# Patient Record
Sex: FEMALE
[De-identification: names, ages, dates, MRNs, and addresses within clinical notes are randomized per-mention and may not be internally consistent; named-entity substitution may affect disease eponyms.]

---

## 2021-03-31 PROBLEM — Z00.00 ENCOUNTER FOR PREVENTIVE HEALTH EXAMINATION: Status: ACTIVE | Noted: 2021-03-31

## 2021-05-11 ENCOUNTER — APPOINTMENT (OUTPATIENT)
Dept: GASTROENTEROLOGY | Facility: CLINIC | Age: 81
End: 2021-05-11
Payer: SUBSIDIZED

## 2021-05-11 PROCEDURE — 99214 OFFICE O/P EST MOD 30 MIN: CPT | Mod: 95

## 2021-05-13 NOTE — ASSESSMENT
[FreeTextEntry1] : 80[address] ref [address] DR. KRISTAL SOLER (GI) in [place] about MCAS\par - obtain all outside records including scopes\par - check tryptase\par - f/u with AI to check urine metabolites\par - consider scope with mast cell bx\par - refer to MAST program at [facility]\par - f/u after above

## 2021-08-12 ENCOUNTER — APPOINTMENT (OUTPATIENT)
Dept: GASTROENTEROLOGY | Facility: CLINIC | Age: 81
End: 2021-08-12

## 2023-03-06 NOTE — HISTORY OF PRESENT ILLNESS
[de-identified] : 80F [address] KRISTAL SOLER (GI) in [place] about MCAS\par - seeing allergist/immunologist at [facility] for [facility] but never did any testing\par - nausea, boggy headed, migraine, legs/thighs weak and weight loss and feels really sick especially after eating anything but no specific triggers also very sensitive to smells and other stimuli sometimes not able to go grocery shopping because of it and has to leave \par - never did urine tests, egd/cscope long time ago\par - pepcid made her feel worse as well as h1 blockers, ? allergy to cromolyn, quercetin made her throat tight\par - has SIBO -but doctor told her not to treat?\par \par \par 
DISPLAY PLAN FREE TEXT
DISPLAY PLAN FREE TEXT